# Patient Record
Sex: MALE | Race: WHITE | NOT HISPANIC OR LATINO | Employment: FULL TIME | URBAN - METROPOLITAN AREA
[De-identification: names, ages, dates, MRNs, and addresses within clinical notes are randomized per-mention and may not be internally consistent; named-entity substitution may affect disease eponyms.]

---

## 2023-12-08 ENCOUNTER — OFFICE VISIT (OUTPATIENT)
Dept: URGENT CARE | Facility: CLINIC | Age: 23
End: 2023-12-08
Payer: COMMERCIAL

## 2023-12-08 ENCOUNTER — APPOINTMENT (OUTPATIENT)
Dept: RADIOLOGY | Facility: CLINIC | Age: 23
End: 2023-12-08
Payer: COMMERCIAL

## 2023-12-08 VITALS
DIASTOLIC BLOOD PRESSURE: 68 MMHG | TEMPERATURE: 97.5 F | RESPIRATION RATE: 18 BRPM | HEART RATE: 58 BPM | OXYGEN SATURATION: 100 % | SYSTOLIC BLOOD PRESSURE: 138 MMHG

## 2023-12-08 DIAGNOSIS — M25.532 LEFT WRIST PAIN: ICD-10-CM

## 2023-12-08 DIAGNOSIS — M25.532 LEFT WRIST PAIN: Primary | ICD-10-CM

## 2023-12-08 PROCEDURE — 99203 OFFICE O/P NEW LOW 30 MIN: CPT | Performed by: FAMILY MEDICINE

## 2023-12-08 PROCEDURE — 73110 X-RAY EXAM OF WRIST: CPT

## 2023-12-08 RX ORDER — ALBUTEROL SULFATE 90 UG/1
2 AEROSOL, METERED RESPIRATORY (INHALATION) EVERY 6 HOURS PRN
COMMUNITY

## 2023-12-08 RX ORDER — NAPROXEN 500 MG/1
500 TABLET ORAL 2 TIMES DAILY WITH MEALS
Qty: 14 TABLET | Refills: 0 | Status: SHIPPED | OUTPATIENT
Start: 2023-12-08 | End: 2023-12-15

## 2023-12-08 NOTE — PROGRESS NOTES
[facility]        NAME: Neeraj English is a 20 y.o. male  : 2000    MRN: [id number]  DATE: 2023  TIME: 11:11 AM    Assessment and Plan   Left wrist pain [M25.532]  1. Left wrist pain  XR wrist 3+ vw left        No acute fractures on x-ray; does have a known old fracture of the capitate. Official read pending. Naproxen prescribed. Advised on icing. Brace applied. Patient Instructions     Follow up with PCP in 3-5 days. Proceed to  ER if symptoms worsen. Chief Complaint     Chief Complaint   Patient presents with   • Wrist Pain     Patient reports he fell yesterday landing on his butt and fell onto the left wrist. Reports he has persistent left wrist pain. Reports painful to touch. Pain increases when bending hand up and down or moving wrist side to side. No swelling observed. History of Present Illness       25-year-old right-hand-dominant male presents today due to a left wrist injury sustained from a fall yesterday. Was tightening a strap on his truck when it broke causing him to fall backwards landing on an outstretched left hand. Reports immediate pain in the left wrist which has continued persist up until this point. Reports pain with active range of movement. Also reports intermittent paresthesias of the left fingers from the median nerve distribution. Wrist Pain   Associated symptoms include numbness (Occasional). Pertinent negatives include no fever. Review of Systems   Review of Systems   Constitutional:  Negative for chills and fever. Respiratory:  Negative for cough and shortness of breath. Cardiovascular:  Negative for chest pain. Gastrointestinal:  Negative for abdominal pain and nausea. Musculoskeletal:  Positive for arthralgias. Negative for joint swelling. Skin:  Negative for color change. Neurological:  Positive for numbness (Occasional). Negative for dizziness and headaches.      Current Medications       Current Outpatient Medications:   •  albuterol (PROVENTIL HFA,VENTOLIN HFA) 90 mcg/act inhaler, Inhale 2 puffs every 6 (six) hours as needed for wheezing, Disp: , Rfl:     Current Allergies     Allergies as of 12/08/2023 - never reviewed   Allergen Reaction Noted   • Biaxin [clarithromycin] Other (See Comments) 12/08/2023   • Penicillins Other (See Comments) 12/08/2023            The following portions of the patient's history were reviewed and updated as appropriate: allergies, current medications, past family history, past medical history, past social history, past surgical history and problem list.     No past medical history on file. No past surgical history on file. No family history on file. Medications have been verified. Objective   /68   Pulse 58   Temp 97.5 °F (36.4 °C)   Resp 18   SpO2 100%   No LMP for male patient. Physical Exam     Physical Exam  Vitals and nursing note reviewed. Constitutional:       General: He is in acute distress. Appearance: Normal appearance. He is normal weight. He is not ill-appearing, toxic-appearing or diaphoretic. HENT:      Head: Normocephalic and atraumatic. Eyes:      General:         Right eye: No discharge. Left eye: No discharge. Conjunctiva/sclera: Conjunctivae normal.   Pulmonary:      Effort: Pulmonary effort is normal.   Musculoskeletal:         General: Tenderness and signs of injury present. No swelling. Comments: Point tenderness in the left snuffbox region. Lesser tenderness in other areas of the left wrist.  Fairly full active ROM of the left wrist.  No obvious swelling or ecchymosis. Skin:     General: Skin is warm. Findings: No erythema. Neurological:      General: No focal deficit present. Mental Status: He is alert and oriented to person, place, and time. Psychiatric:         Mood and Affect: Mood normal.         Behavior: Behavior normal.         Thought Content:  Thought content normal. Judgment: Judgment normal.